# Patient Record
Sex: FEMALE | Race: WHITE | URBAN - METROPOLITAN AREA
[De-identification: names, ages, dates, MRNs, and addresses within clinical notes are randomized per-mention and may not be internally consistent; named-entity substitution may affect disease eponyms.]

---

## 2020-02-04 ENCOUNTER — OFFICE VISIT (OUTPATIENT)
Dept: FAMILY MEDICINE CLINIC | Facility: CLINIC | Age: 25
End: 2020-02-04
Payer: COMMERCIAL

## 2020-02-04 VITALS
HEART RATE: 96 BPM | BODY MASS INDEX: 20.31 KG/M2 | OXYGEN SATURATION: 98 % | HEIGHT: 63 IN | RESPIRATION RATE: 16 BRPM | SYSTOLIC BLOOD PRESSURE: 114 MMHG | TEMPERATURE: 98 F | DIASTOLIC BLOOD PRESSURE: 70 MMHG | WEIGHT: 114.63 LBS

## 2020-02-04 DIAGNOSIS — J00 ACUTE NASOPHARYNGITIS: Primary | ICD-10-CM

## 2020-02-04 DIAGNOSIS — J02.9 SORE THROAT: ICD-10-CM

## 2020-02-04 LAB
CONTROL LINE PRESENT WITH A CLEAR BACKGROUND (YES/NO): YES YES/NO
KIT LOT #: NORMAL NUMERIC
STREP GRP A CUL-SCR: NEGATIVE

## 2020-02-04 PROCEDURE — 87880 STREP A ASSAY W/OPTIC: CPT | Performed by: NURSE PRACTITIONER

## 2020-02-04 PROCEDURE — 87081 CULTURE SCREEN ONLY: CPT | Performed by: NURSE PRACTITIONER

## 2020-02-04 PROCEDURE — 99203 OFFICE O/P NEW LOW 30 MIN: CPT | Performed by: NURSE PRACTITIONER

## 2020-02-04 RX ORDER — DESOGESTREL AND ETHINYL ESTRADIOL 0.15-0.03
1 KIT ORAL DAILY
COMMUNITY
End: 2020-11-10 | Stop reason: ALTCHOICE

## 2020-02-04 NOTE — PROGRESS NOTES
CHIEF COMPLAINT:   Patient presents with:  Sore Throat  Nasal Congestion  Headache      HPI:   Ghulam Gonzalez is a 27year old female who presents to clinic today with complaints of feeling ill for the last 2 days   No cough-   Baby with RSV recently year old female who presents with ear problems symptoms are consistent with  ASSESSMENT:  Acute nasopharyngitis  (primary encounter diagnosis)  Sore throat    PLAN: Meds as listed below.   Comfort measures as described in Patient Instructions  Patient Instr

## 2020-02-04 NOTE — PATIENT INSTRUCTIONS
Rest, increase fluids, salt water gargles ,cameron pot (use distilled water) or saline nasal spray,  Advil cold and sinus (behind the counter), Alavert, halls Tylenol follow up if symptoms persist or increase.

## 2020-02-08 ENCOUNTER — TELEPHONE (OUTPATIENT)
Dept: FAMILY MEDICINE CLINIC | Facility: CLINIC | Age: 25
End: 2020-02-08

## 2020-02-08 RX ORDER — AMOXICILLIN 500 MG/1
500 CAPSULE ORAL 2 TIMES DAILY
Qty: 20 CAPSULE | Refills: 0 | Status: SHIPPED | OUTPATIENT
Start: 2020-02-08 | End: 2020-02-18

## 2020-02-08 NOTE — TELEPHONE ENCOUNTER
----- Message from DIXON Golden sent at 2/8/2020 11:20 AM CST -----  Results reviewed. Please inform patient that her strep culture came back positive for strep group A. I am sending her antibiotics to the [facility] she has on file.   She will begi

## 2020-02-11 ENCOUNTER — TELEPHONE (OUTPATIENT)
Dept: FAMILY MEDICINE CLINIC | Facility: CLINIC | Age: 25
End: 2020-02-11

## 2020-02-11 NOTE — TELEPHONE ENCOUNTER
----- Message from DIXON Aguilar sent at 2/8/2020 11:20 AM CST -----  Results reviewed. Please inform patient that her strep culture came back positive for strep group A. I am sending her antibiotics to the [facility] she has on file.   She will begi

## 2020-07-08 ENCOUNTER — APPOINTMENT (OUTPATIENT)
Dept: LAB | Age: 25
End: 2020-07-08
Attending: NURSE PRACTITIONER
Payer: COMMERCIAL

## 2020-07-08 ENCOUNTER — OFFICE VISIT (OUTPATIENT)
Dept: FAMILY MEDICINE CLINIC | Facility: CLINIC | Age: 25
End: 2020-07-08
Payer: COMMERCIAL

## 2020-07-08 VITALS
BODY MASS INDEX: 20.37 KG/M2 | WEIGHT: 115 LBS | RESPIRATION RATE: 18 BRPM | TEMPERATURE: 98 F | SYSTOLIC BLOOD PRESSURE: 90 MMHG | DIASTOLIC BLOOD PRESSURE: 54 MMHG | HEART RATE: 96 BPM | OXYGEN SATURATION: 99 % | HEIGHT: 63 IN

## 2020-07-08 DIAGNOSIS — R11.0 NAUSEA: Primary | ICD-10-CM

## 2020-07-08 DIAGNOSIS — R10.11 RIGHT UPPER QUADRANT ABDOMINAL PAIN: ICD-10-CM

## 2020-07-08 LAB
ALBUMIN SERPL-MCNC: 4 G/DL (ref 3.4–5)
ALBUMIN/GLOB SERPL: 1 {RATIO} (ref 1–2)
ALP LIVER SERPL-CCNC: 58 U/L (ref 52–144)
ALT SERPL-CCNC: 18 U/L (ref 13–56)
ANION GAP SERPL CALC-SCNC: 5 MMOL/L (ref 0–18)
APPEARANCE: CLEAR
AST SERPL-CCNC: 22 U/L (ref 15–37)
BASOPHILS # BLD AUTO: 0.05 X10(3) UL (ref 0–0.2)
BASOPHILS NFR BLD AUTO: 1.1 %
BILIRUB SERPL-MCNC: 0.3 MG/DL (ref 0.1–2)
BUN BLD-MCNC: 10 MG/DL (ref 7–18)
BUN/CREAT SERPL: 13.7 (ref 10–20)
CALCIUM BLD-MCNC: 9.7 MG/DL (ref 8.5–10.1)
CHLORIDE SERPL-SCNC: 109 MMOL/L (ref 98–112)
CO2 SERPL-SCNC: 24 MMOL/L (ref 21–32)
CREAT BLD-MCNC: 0.73 MG/DL (ref 0.55–1.02)
DEPRECATED HBV CORE AB SER IA-ACNC: 51.7 NG/ML (ref 12–114)
DEPRECATED RDW RBC AUTO: 38.4 FL (ref 35.1–46.3)
EOSINOPHIL # BLD AUTO: 0.12 X10(3) UL (ref 0–0.7)
EOSINOPHIL NFR BLD AUTO: 2.6 %
ERYTHROCYTE [DISTWIDTH] IN BLOOD BY AUTOMATED COUNT: 12.3 % (ref 11–15)
GLOBULIN PLAS-MCNC: 4 G/DL (ref 2.8–4.4)
GLUCOSE BLD-MCNC: 81 MG/DL (ref 70–99)
HCT VFR BLD AUTO: 39.1 % (ref 35–48)
HGB BLD-MCNC: 12.5 G/DL (ref 12–16)
IMM GRANULOCYTES # BLD AUTO: 0 X10(3) UL (ref 0–1)
IMM GRANULOCYTES NFR BLD: 0 %
IRON SATURATION: 19 % (ref 15–50)
IRON SERPL-MCNC: 90 UG/DL (ref 50–170)
LYMPHOCYTES # BLD AUTO: 1.71 X10(3) UL (ref 1–4)
LYMPHOCYTES NFR BLD AUTO: 36.5 %
M PROTEIN MFR SERPL ELPH: 8 G/DL (ref 6.4–8.2)
MCH RBC QN AUTO: 27.5 PG (ref 26–34)
MCHC RBC AUTO-ENTMCNC: 32 G/DL (ref 31–37)
MCV RBC AUTO: 85.9 FL (ref 80–100)
MONOCYTES # BLD AUTO: 0.36 X10(3) UL (ref 0.1–1)
MONOCYTES NFR BLD AUTO: 7.7 %
MULTISTIX LOT#: NORMAL NUMERIC
NEUTROPHILS # BLD AUTO: 2.45 X10 (3) UL (ref 1.5–7.7)
NEUTROPHILS # BLD AUTO: 2.45 X10(3) UL (ref 1.5–7.7)
NEUTROPHILS NFR BLD AUTO: 52.1 %
OSMOLALITY SERPL CALC.SUM OF ELEC: 284 MOSM/KG (ref 275–295)
PATIENT FASTING Y/N/NP: NO
PH, URINE: 7 (ref 4.5–8)
PLATELET # BLD AUTO: 245 10(3)UL (ref 150–450)
POTASSIUM SERPL-SCNC: 4.6 MMOL/L (ref 3.5–5.1)
RBC # BLD AUTO: 4.55 X10(6)UL (ref 3.8–5.3)
SODIUM SERPL-SCNC: 138 MMOL/L (ref 136–145)
SPECIFIC GRAVITY: 1.02 (ref 1–1.03)
T4 FREE SERPL-MCNC: 0.9 NG/DL (ref 0.8–1.7)
TOTAL IRON BINDING CAPACITY: 484 UG/DL (ref 240–450)
TRANSFERRIN SERPL-MCNC: 325 MG/DL (ref 200–360)
TSI SER-ACNC: 2.79 MIU/ML (ref 0.36–3.74)
URINE-COLOR: YELLOW
UROBILINOGEN,SEMI-QN: 0.2 MG/DL (ref 0–1.9)
VIT B12 SERPL-MCNC: 168 PG/ML (ref 193–986)
WBC # BLD AUTO: 4.7 X10(3) UL (ref 4–11)

## 2020-07-08 PROCEDURE — 84439 ASSAY OF FREE THYROXINE: CPT | Performed by: NURSE PRACTITIONER

## 2020-07-08 PROCEDURE — 82607 VITAMIN B-12: CPT | Performed by: NURSE PRACTITIONER

## 2020-07-08 PROCEDURE — 83540 ASSAY OF IRON: CPT | Performed by: NURSE PRACTITIONER

## 2020-07-08 PROCEDURE — 99214 OFFICE O/P EST MOD 30 MIN: CPT | Performed by: NURSE PRACTITIONER

## 2020-07-08 PROCEDURE — 83550 IRON BINDING TEST: CPT | Performed by: NURSE PRACTITIONER

## 2020-07-08 PROCEDURE — 82728 ASSAY OF FERRITIN: CPT | Performed by: NURSE PRACTITIONER

## 2020-07-08 PROCEDURE — 81003 URINALYSIS AUTO W/O SCOPE: CPT | Performed by: NURSE PRACTITIONER

## 2020-07-08 PROCEDURE — 36415 COLL VENOUS BLD VENIPUNCTURE: CPT | Performed by: NURSE PRACTITIONER

## 2020-07-08 PROCEDURE — 80050 GENERAL HEALTH PANEL: CPT | Performed by: NURSE PRACTITIONER

## 2020-07-08 NOTE — PROGRESS NOTES
Julia Zepeda is a 23year old female.   Patient presents with:  Abdominal Pain: x couple months, nausea everyday      HPI:   Complaints of nausea for a couple of months    Severe abdominal pain - lower abdomen- balled up on the couch- felt like contract adenopathy, normal thyroid, no nodules  LUNGS: normal rate without respiratory distress, lungs clear to auscultation  CARDIO: RRR without murmur, no edema  GI: Soft, slightly tender to right upper quadrant, no guarding, no rebound, no masses.   Earnest Province

## 2020-07-08 NOTE — PATIENT INSTRUCTIONS
Low fat/ fat free diet (< 2 G)      Follow up at [facility] for ultrasound-nothing to eat the morning of you may have small sips of water eat a low-fat diet or fat-free diet the night before very light. Call [phone] to schedule.     If ultrasound

## 2020-07-09 ENCOUNTER — TELEPHONE (OUTPATIENT)
Dept: FAMILY MEDICINE CLINIC | Facility: CLINIC | Age: 25
End: 2020-07-09

## 2020-07-09 DIAGNOSIS — E53.8 VITAMIN B 12 DEFICIENCY: Primary | ICD-10-CM

## 2020-07-09 NOTE — TELEPHONE ENCOUNTER
----- Message from DIXON Payne sent at 7/9/2020 11:09 AM CDT -----  Please notify patient that her hemoglobin is good at 12. 5–she is not anemic.   Her iron is also good, blood sugar, liver kidney, electrolytes, thyroid are all normal.  Her vitamin

## 2020-07-13 ENCOUNTER — TELEPHONE (OUTPATIENT)
Dept: FAMILY MEDICINE CLINIC | Facility: CLINIC | Age: 25
End: 2020-07-13

## 2020-07-13 DIAGNOSIS — R10.11 RIGHT UPPER QUADRANT ABDOMINAL PAIN: Primary | ICD-10-CM

## 2020-07-13 NOTE — TELEPHONE ENCOUNTER
Please notify patient that the ultrasound of her abdomen is essentially normal–there are no gallstones and no blockages.   I would recommend that patient schedule a HIDA scan with [facility]–please fax order to [facility] and give patient contact information to

## 2020-07-14 NOTE — TELEPHONE ENCOUNTER
Let pt know the following below. Pt verbalized her understanding and had no other questions at this time. Order faxed.

## 2020-07-29 ENCOUNTER — TELEPHONE (OUTPATIENT)
Dept: FAMILY MEDICINE CLINIC | Facility: CLINIC | Age: 25
End: 2020-07-29

## 2020-07-29 DIAGNOSIS — K81.1 CHOLECYSTITIS, CHRONIC: Primary | ICD-10-CM

## 2020-07-29 NOTE — TELEPHONE ENCOUNTER
Patient informed of the below results and recommendations. Patient in agreement to see Dr. Yin July. Contact information given to patient to schedule an appt.   Please advise referral.

## 2020-07-29 NOTE — TELEPHONE ENCOUNTER
Please notify patient that her HIDA scan shows that her gallbladder is not functioning well–her ejection fraction is 25%–this is low.   Would recommend that patient have a consultation with a surgeon–please asked patient if she has a preference otherwise wo

## 2020-11-10 ENCOUNTER — OFFICE VISIT (OUTPATIENT)
Dept: FAMILY MEDICINE CLINIC | Facility: CLINIC | Age: 25
End: 2020-11-10
Payer: COMMERCIAL

## 2020-11-10 VITALS
SYSTOLIC BLOOD PRESSURE: 102 MMHG | DIASTOLIC BLOOD PRESSURE: 60 MMHG | HEART RATE: 96 BPM | RESPIRATION RATE: 16 BRPM | BODY MASS INDEX: 19.49 KG/M2 | TEMPERATURE: 98 F | OXYGEN SATURATION: 98 % | WEIGHT: 110 LBS | HEIGHT: 63 IN

## 2020-11-10 DIAGNOSIS — M54.50 ACUTE BILATERAL LOW BACK PAIN WITHOUT SCIATICA: Primary | ICD-10-CM

## 2020-11-10 PROCEDURE — 3074F SYST BP LT 130 MM HG: CPT | Performed by: NURSE PRACTITIONER

## 2020-11-10 PROCEDURE — 3078F DIAST BP <80 MM HG: CPT | Performed by: NURSE PRACTITIONER

## 2020-11-10 PROCEDURE — 3008F BODY MASS INDEX DOCD: CPT | Performed by: NURSE PRACTITIONER

## 2020-11-10 PROCEDURE — 99072 ADDL SUPL MATRL&STAF TM PHE: CPT | Performed by: NURSE PRACTITIONER

## 2020-11-10 PROCEDURE — 99214 OFFICE O/P EST MOD 30 MIN: CPT | Performed by: NURSE PRACTITIONER

## 2020-11-10 RX ORDER — CYCLOBENZAPRINE HCL 5 MG
5 TABLET ORAL NIGHTLY PRN
Qty: 30 TABLET | Refills: 1 | Status: SHIPPED | OUTPATIENT
Start: 2020-11-10 | End: 2021-04-05

## 2020-11-10 RX ORDER — ACETAMINOPHEN AND CODEINE PHOSPHATE 120; 12 MG/5ML; MG/5ML
1 SOLUTION ORAL DAILY
COMMUNITY
Start: 2020-10-21

## 2020-11-10 NOTE — PATIENT INSTRUCTIONS
Take Ibuprofen 600mg three times a day with food, or aleve 1-2 pills twice a day with food. Muscle relaxant at bed time     Wear supportive shoes, heating pad alternate with ice,  Rest, avoid lifting.      Follow up if symptoms persist or increase

## 2020-11-10 NOTE — PROGRESS NOTES
Denise Vann is a 23year old female.   Patient presents with:  Back Pain: lower back pain x3 days      HPI:   Complaints of pain to lower back bilaterally - for a few days, no injury   Symptoms gradually  Hurting   Pain is constant- occasional sharper lesions  LUNGS: normal rate without respiratory distress, lungs clear to auscultation  CARDIO: RRR without murmur, no edema  MUSCULOSKELETAL: Back–no erythema, ecchymosis, edema–positive tight musculature to right Paraspinous muscles–full range of motion–s

## 2021-02-16 ENCOUNTER — OFFICE VISIT (OUTPATIENT)
Dept: FAMILY MEDICINE CLINIC | Facility: CLINIC | Age: 26
End: 2021-02-16
Payer: COMMERCIAL

## 2021-02-16 VITALS
WEIGHT: 112.38 LBS | DIASTOLIC BLOOD PRESSURE: 58 MMHG | SYSTOLIC BLOOD PRESSURE: 100 MMHG | RESPIRATION RATE: 16 BRPM | HEIGHT: 63 IN | HEART RATE: 100 BPM | BODY MASS INDEX: 19.91 KG/M2 | OXYGEN SATURATION: 99 % | TEMPERATURE: 99 F

## 2021-02-16 DIAGNOSIS — H61.23 BILATERAL IMPACTED CERUMEN: ICD-10-CM

## 2021-02-16 DIAGNOSIS — H65.03 NON-RECURRENT ACUTE SEROUS OTITIS MEDIA OF BOTH EARS: Primary | ICD-10-CM

## 2021-02-16 DIAGNOSIS — H93.11 TINNITUS OF RIGHT EAR: ICD-10-CM

## 2021-02-16 PROCEDURE — 3074F SYST BP LT 130 MM HG: CPT | Performed by: NURSE PRACTITIONER

## 2021-02-16 PROCEDURE — 3008F BODY MASS INDEX DOCD: CPT | Performed by: NURSE PRACTITIONER

## 2021-02-16 PROCEDURE — 3078F DIAST BP <80 MM HG: CPT | Performed by: NURSE PRACTITIONER

## 2021-02-16 PROCEDURE — 99214 OFFICE O/P EST MOD 30 MIN: CPT | Performed by: NURSE PRACTITIONER

## 2021-02-16 NOTE — PATIENT INSTRUCTIONS
To maintain wax removal recommend hydrogen peroxide or Debrox wax softening drops once a week–soak your ears for 15 minutes prior to shower and then rinse with warm water–rinse your ears with warm water every time you shower.     For the fluid behind your e

## 2021-02-16 NOTE — PROGRESS NOTES
CHIEF COMPLAINT:   Patient presents with:  Ear Pain: ringing in ears/pain      HPI:   Wong Andersen is a 23year old female who presents to clinic today with complaints of right ear is ringing for 4-5 days   Tried ear drops- dint help   No sinus conge injected. No exudates. NECK: supple, non-tender  THYROID: Normal size, no nodules  LUNGS: clear to auscultation bilaterally, no wheezes or rhonchi. Breathing is non labored.   CARDIO: RRR without murmur  SKIN: no rashes,no suspicious lesions  ASSESSMENT AN

## 2021-04-02 NOTE — PROGRESS NOTES
Deborah Fernandez is a 23year old female. Patient presents with:   Anxiety: anxious      HPI:   Patient presents in the office today with complaints of anxiety–states she has been seeing a counselor for a while–and the counselor feels that she may benefit vaginal delivery) 2014, 2019    x's 2       Social History:  Social History    Tobacco Use      Smoking status: Never Smoker      Smokeless tobacco: Never Used    Vaping Use      Vaping Use: Never used    Alcohol use: Never    Drug use: Never    No family avoid excessive caffeine, avoid alcohol, cigarettes, and street drugs. Write yourself a letter about everything that bothers you and how you are currently feeling be specific and give yourself details for the future.     Start Lexapro 1 by mouth daily

## 2021-04-05 ENCOUNTER — VIRTUAL PHONE E/M (OUTPATIENT)
Dept: FAMILY MEDICINE CLINIC | Facility: CLINIC | Age: 26
End: 2021-04-05
Payer: COMMERCIAL

## 2021-04-05 VITALS — WEIGHT: 108 LBS | BODY MASS INDEX: 19.14 KG/M2 | HEIGHT: 63 IN

## 2021-04-05 DIAGNOSIS — Z20.822 CLOSE EXPOSURE TO COVID-19 VIRUS: Primary | ICD-10-CM

## 2021-04-05 PROCEDURE — 99212 OFFICE O/P EST SF 10 MIN: CPT | Performed by: NURSE PRACTITIONER

## 2021-04-05 PROCEDURE — 3008F BODY MASS INDEX DOCD: CPT | Performed by: NURSE PRACTITIONER

## 2021-04-05 NOTE — PROGRESS NOTES
Sarah Dear  verbally consents to a Virtual/Telephone Check-In service on 4/5/2021. Patient understands and accepts financial responsibility for any deductible, co-insurance and/or co-pays associated with this service.     Duration of the service: 8 No N/V/C/D. NEURO: denies complaints    EXAM:   [facility] 06/24/2020 (Approximate)   GENERAL: Patient sounds to be in no apparent distress over the phone–physical exam done per patient–see HPI.     ASSESSMENT AND PLAN:   Jing Cook is a 23year old female

## 2021-04-09 ENCOUNTER — TELEPHONE (OUTPATIENT)
Dept: FAMILY MEDICINE CLINIC | Facility: CLINIC | Age: 26
End: 2021-04-09

## 2021-04-09 NOTE — TELEPHONE ENCOUNTER
Fax received from [facility]. Patient's Covid test done on 4/8/2021 came back Negative.

## 2021-04-09 NOTE — TELEPHONE ENCOUNTER
Patient's Covid test through [facility] came back as negative. Per visit indicated the patient was not symptomatic but had close Covid exposure.   Recommend continue with monitoring and isolation precautions, notify the office if she does develop symptom

## 2021-06-03 RX ORDER — ESCITALOPRAM OXALATE 10 MG/1
10 TABLET ORAL DAILY
Qty: 30 TABLET | Refills: 1 | OUTPATIENT
Start: 2021-06-03

## 2021-06-03 NOTE — TELEPHONE ENCOUNTER
Future Appointments   Date Time Provider Gary Herzog   6/3/2021  4:30 PM DIXON Barahona [facility]

## 2021-06-03 NOTE — PROGRESS NOTES
Denise Vann is a 27year old female. No chief complaint on file.       HPI:   Patient presents the office today via video visit for follow-up of anxiety -  Taking lexapro -states she is feeling better feels like medication is working–states that she d exertion, no cough  CARDIOVASCULAR: denies complaints   GI: denies abdominal pain, nausea, vomiting, diarrhea   MUSCULOSKELETAL:  No arthralgias or myalgias  NEURO: denies headaches, denies dizziness  PSYCH: See HPI    EXAM:   [facility] 06/24/2020 (Approximate)

## 2021-06-03 NOTE — TELEPHONE ENCOUNTER
Future appt:    Last Appointment with provider:   4/2/2021  Last appointment at [facility]:  4/2/2021  No results found for: CHOLEST, HDL, LDL, TRIGLY, TRIG  No results found for: EAG, A1C  Lab Results   Component Value Date    T4F 0.9 07/08/2020    TSH

## 2021-10-18 ENCOUNTER — TELEPHONE (OUTPATIENT)
Dept: FAMILY MEDICINE CLINIC | Facility: CLINIC | Age: 26
End: 2021-10-18

## 2021-10-18 NOTE — TELEPHONE ENCOUNTER
Pt calling and state she is having some light headed and Dizziness for 2 weeks. She is concerned about driving and feels this has to do with her eating/stomache issues. Call sent to nurse to triage.

## 2021-10-18 NOTE — TELEPHONE ENCOUNTER
Pt called back and states that she was wondering if she should wait to go to ER or what she should do    I recommended that she goes now since this has gotten progressively worse over the past two weeks.     She states that sounds okay    Will call back if

## 2021-10-18 NOTE — TELEPHONE ENCOUNTER
Please advise-    Pt is calling today because since her gallbladder was removed (June 2021) she has had issues eating. States that just the thought of eating will make her feel sick, she tries to eat sometimes and it immediately makes her nauseas.      Pt s

## 2021-10-18 NOTE — TELEPHONE ENCOUNTER
Called and left a message-  [email or web address] message also sent. Pt was aware I would send a [email or web address] message if she didn't answer.

## 2021-10-18 NOTE — TELEPHONE ENCOUNTER
If symptoms are worsening would recommend emergency room she may need IV antibiotics would recommend trying some Gatorade and –over-the-counter antinausea medications

## 2022-06-30 ENCOUNTER — OFFICE VISIT (OUTPATIENT)
Dept: FAMILY MEDICINE CLINIC | Facility: CLINIC | Age: 27
End: 2022-06-30
Payer: COMMERCIAL

## 2022-06-30 VITALS
OXYGEN SATURATION: 99 % | WEIGHT: 108 LBS | RESPIRATION RATE: 18 BRPM | SYSTOLIC BLOOD PRESSURE: 106 MMHG | BODY MASS INDEX: 19.14 KG/M2 | HEIGHT: 63 IN | TEMPERATURE: 98 F | DIASTOLIC BLOOD PRESSURE: 60 MMHG | HEART RATE: 88 BPM

## 2022-06-30 DIAGNOSIS — Z00.00 ENCOUNTER FOR HEALTH MAINTENANCE EXAMINATION IN ADULT: Primary | ICD-10-CM

## 2022-06-30 DIAGNOSIS — Z01.419 ENCOUNTER FOR GYNECOLOGICAL EXAMINATION: ICD-10-CM

## 2022-06-30 PROBLEM — O47.9 IRREGULAR CONTRACTIONS: Status: ACTIVE | Noted: 2019-09-19

## 2022-06-30 PROBLEM — K59.00 CONSTIPATION: Status: ACTIVE | Noted: 2022-01-06

## 2022-06-30 PROBLEM — O21.9 NAUSEA AND VOMITING OF PREGNANCY, ANTEPARTUM: Status: ACTIVE | Noted: 2019-08-28

## 2022-06-30 PROBLEM — R10.13 EPIGASTRIC PAIN: Status: ACTIVE | Noted: 2022-01-06

## 2022-06-30 PROBLEM — O47.9 UTERINE CONTRACTIONS DURING PREGNANCY: Status: ACTIVE | Noted: 2019-09-20

## 2022-06-30 PROBLEM — R10.84 GENERALIZED ABDOMINAL PAIN: Status: ACTIVE | Noted: 2022-01-06

## 2022-06-30 PROCEDURE — 3078F DIAST BP <80 MM HG: CPT | Performed by: NURSE PRACTITIONER

## 2022-06-30 PROCEDURE — 99395 PREV VISIT EST AGE 18-39: CPT | Performed by: NURSE PRACTITIONER

## 2022-06-30 PROCEDURE — 3008F BODY MASS INDEX DOCD: CPT | Performed by: NURSE PRACTITIONER

## 2022-06-30 PROCEDURE — 3074F SYST BP LT 130 MM HG: CPT | Performed by: NURSE PRACTITIONER

## 2022-06-30 NOTE — PATIENT INSTRUCTIONS
Check to see if a Quantiferon Gold TB blood test is acceptable - otherwise you can do your TB test here or [facility]     Pap smear today - results will be back within 1-2 weeks and we will notify you     Recommend fasting blood work next year     Form filled out for [employer]

## 2022-07-11 ENCOUNTER — TELEPHONE (OUTPATIENT)
Dept: FAMILY MEDICINE CLINIC | Facility: CLINIC | Age: 27
End: 2022-07-11

## 2022-07-11 NOTE — TELEPHONE ENCOUNTER
----- Message from DIXON Figueroa sent at 7/11/2022 11:50 AM CDT -----  Please notify patient that her Pap smear is within normal limits. Recommend annual physical, will discuss need for Pap at physical next year. Thank you.
Provider message viewed by patient in [employer].
done
done

## 2022-09-21 ENCOUNTER — TELEPHONE (OUTPATIENT)
Dept: FAMILY MEDICINE CLINIC | Facility: CLINIC | Age: 27
End: 2022-09-21

## 2022-09-21 NOTE — TELEPHONE ENCOUNTER
Spoke with patient. States she has been having more frequent panic attacks the last couple weeks. States her heart races and her chest feels tight during these episodes. Patient states it has been a really long time since this has happened. Patient c/o frequent headaches and trouble sleeping. Patient also c/o a couple dizzy spells recently. States when she gets dizzy, she can feel her knees buckle and she needs to sit down. Patient states one of the times she kind of fell into her  and he had to catch her. States yesterday this happened in the shower. States she became dizzy and when she went to sit down on the seat in the shower, her foot slipped, and she hit her head on the rail. Patient states she did sit on the seat; she denies falling. Patient denies passing out any of the times she has felt dizzy. Patient states the panic attacks and dizziness occur randomly and at separate times. Patient cannot think of anything specific that bring either episodes on. Appt r/s for a sooner time. Please advise.      Future Appointments   Date Time Provider Gary Herzog   9/22/2022  1:00 PM DIXON Spencer [facility]

## 2022-09-21 NOTE — TELEPHONE ENCOUNTER
Future Appointments   Date Time Provider Gary Herzog   9/29/2022  3:30 PM DIXON Singh [facility] [place]     made an appt via [employer] for \"Anxiety resurfacing with problems sleeping, panic attacks and dizziness occurring frequently\"

## 2022-09-22 ENCOUNTER — OFFICE VISIT (OUTPATIENT)
Dept: FAMILY MEDICINE CLINIC | Facility: CLINIC | Age: 27
End: 2022-09-22

## 2022-09-22 ENCOUNTER — LABORATORY ENCOUNTER (OUTPATIENT)
Dept: LAB | Age: 27
End: 2022-09-22
Attending: NURSE PRACTITIONER

## 2022-09-22 VITALS
TEMPERATURE: 98 F | OXYGEN SATURATION: 100 % | WEIGHT: 109.63 LBS | HEART RATE: 76 BPM | SYSTOLIC BLOOD PRESSURE: 106 MMHG | RESPIRATION RATE: 16 BRPM | HEIGHT: 62.5 IN | DIASTOLIC BLOOD PRESSURE: 60 MMHG | BODY MASS INDEX: 19.67 KG/M2

## 2022-09-22 DIAGNOSIS — R42 LIGHT HEADED: ICD-10-CM

## 2022-09-22 DIAGNOSIS — F41.9 ANXIETY: ICD-10-CM

## 2022-09-22 DIAGNOSIS — Z86.2 HISTORY OF IRON DEFICIENCY ANEMIA: ICD-10-CM

## 2022-09-22 DIAGNOSIS — R53.83 FATIGUE, UNSPECIFIED TYPE: ICD-10-CM

## 2022-09-22 DIAGNOSIS — R53.83 FATIGUE, UNSPECIFIED TYPE: Primary | ICD-10-CM

## 2022-09-22 LAB
ALBUMIN SERPL-MCNC: 4.1 G/DL (ref 3.4–5)
ALBUMIN/GLOB SERPL: 1.2 {RATIO} (ref 1–2)
ALP LIVER SERPL-CCNC: 65 U/L
ALT SERPL-CCNC: 13 U/L
ANION GAP SERPL CALC-SCNC: 4 MMOL/L (ref 0–18)
AST SERPL-CCNC: 12 U/L (ref 15–37)
B-HCG SERPL-ACNC: <1 MIU/ML
BASOPHILS # BLD AUTO: 0.09 X10(3) UL (ref 0–0.2)
BASOPHILS NFR BLD AUTO: 1.6 %
BILIRUB SERPL-MCNC: 0.3 MG/DL (ref 0.1–2)
BUN BLD-MCNC: 10 MG/DL (ref 7–18)
CALCIUM BLD-MCNC: 9.3 MG/DL (ref 8.5–10.1)
CHLORIDE SERPL-SCNC: 108 MMOL/L (ref 98–112)
CO2 SERPL-SCNC: 27 MMOL/L (ref 21–32)
CREAT BLD-MCNC: 0.74 MG/DL
DEPRECATED HBV CORE AB SER IA-ACNC: 15.1 NG/ML
EOSINOPHIL # BLD AUTO: 0.11 X10(3) UL (ref 0–0.7)
EOSINOPHIL NFR BLD AUTO: 2 %
ERYTHROCYTE [DISTWIDTH] IN BLOOD BY AUTOMATED COUNT: 13.2 %
FASTING STATUS PATIENT QL REPORTED: NO
GFR SERPLBLD BASED ON 1.73 SQ M-ARVRAT: 115 ML/MIN/1.73M2 (ref 60–?)
GLOBULIN PLAS-MCNC: 3.5 G/DL (ref 2.8–4.4)
GLUCOSE BLD-MCNC: 83 MG/DL (ref 70–99)
HCT VFR BLD AUTO: 40.1 %
HGB BLD-MCNC: 12.9 G/DL
IMM GRANULOCYTES # BLD AUTO: 0.01 X10(3) UL (ref 0–1)
IMM GRANULOCYTES NFR BLD: 0.2 %
IRON SATN MFR SERPL: 21 %
IRON SERPL-MCNC: 89 UG/DL
LYMPHOCYTES # BLD AUTO: 1.89 X10(3) UL (ref 1–4)
LYMPHOCYTES NFR BLD AUTO: 34.2 %
MAGNESIUM SERPL-MCNC: 2.2 MG/DL (ref 1.6–2.6)
MCH RBC QN AUTO: 27.9 PG (ref 26–34)
MCHC RBC AUTO-ENTMCNC: 32.2 G/DL (ref 31–37)
MCV RBC AUTO: 86.6 FL
MONOCYTES # BLD AUTO: 0.43 X10(3) UL (ref 0.1–1)
MONOCYTES NFR BLD AUTO: 7.8 %
NEUTROPHILS # BLD AUTO: 3 X10 (3) UL (ref 1.5–7.7)
NEUTROPHILS # BLD AUTO: 3 X10(3) UL (ref 1.5–7.7)
NEUTROPHILS NFR BLD AUTO: 54.2 %
OSMOLALITY SERPL CALC.SUM OF ELEC: 286 MOSM/KG (ref 275–295)
PLATELET # BLD AUTO: 248 10(3)UL (ref 150–450)
POTASSIUM SERPL-SCNC: 4.1 MMOL/L (ref 3.5–5.1)
PROT SERPL-MCNC: 7.6 G/DL (ref 6.4–8.2)
RBC # BLD AUTO: 4.63 X10(6)UL
SODIUM SERPL-SCNC: 139 MMOL/L (ref 136–145)
T4 FREE SERPL-MCNC: 0.8 NG/DL (ref 0.8–1.7)
TIBC SERPL-MCNC: 423 UG/DL (ref 240–450)
TRANSFERRIN SERPL-MCNC: 284 MG/DL (ref 200–360)
TSI SER-ACNC: 2.26 MIU/ML (ref 0.36–3.74)
VIT B12 SERPL-MCNC: 275 PG/ML (ref 193–986)
WBC # BLD AUTO: 5.5 X10(3) UL (ref 4–11)

## 2022-09-22 PROCEDURE — 3008F BODY MASS INDEX DOCD: CPT | Performed by: NURSE PRACTITIONER

## 2022-09-22 PROCEDURE — 80050 GENERAL HEALTH PANEL: CPT | Performed by: NURSE PRACTITIONER

## 2022-09-22 PROCEDURE — 83540 ASSAY OF IRON: CPT | Performed by: NURSE PRACTITIONER

## 2022-09-22 PROCEDURE — 3074F SYST BP LT 130 MM HG: CPT | Performed by: NURSE PRACTITIONER

## 2022-09-22 PROCEDURE — 82728 ASSAY OF FERRITIN: CPT | Performed by: NURSE PRACTITIONER

## 2022-09-22 PROCEDURE — 84439 ASSAY OF FREE THYROXINE: CPT | Performed by: NURSE PRACTITIONER

## 2022-09-22 PROCEDURE — 84702 CHORIONIC GONADOTROPIN TEST: CPT | Performed by: NURSE PRACTITIONER

## 2022-09-22 PROCEDURE — 83735 ASSAY OF MAGNESIUM: CPT | Performed by: NURSE PRACTITIONER

## 2022-09-22 PROCEDURE — 82607 VITAMIN B-12: CPT | Performed by: NURSE PRACTITIONER

## 2022-09-22 PROCEDURE — 83550 IRON BINDING TEST: CPT | Performed by: NURSE PRACTITIONER

## 2022-09-22 PROCEDURE — 3078F DIAST BP <80 MM HG: CPT | Performed by: NURSE PRACTITIONER

## 2022-09-22 PROCEDURE — 99214 OFFICE O/P EST MOD 30 MIN: CPT | Performed by: NURSE PRACTITIONER

## 2022-09-22 NOTE — PATIENT INSTRUCTIONS
Non fasting blood work today     Use Apple watch to monitor your sleep -  If abnormal sleep -then recommend appointment with [facility] or DR. Mckeon for sleep evaluation     It is important to get enough sleep (at least 7 hrs a night). Increase EXERCISE, eat a healthy diet (5 fruits and/or vegetables a day), stay hydrated,  take a multivitamin, take fish/krill oil capsules, learn something new, avoid excessive caffeine, avoid alcohol, cigarettes, and street drugs.      Follow up if symptoms persist or increase

## 2022-09-23 ENCOUNTER — TELEPHONE (OUTPATIENT)
Dept: FAMILY MEDICINE CLINIC | Facility: CLINIC | Age: 27
End: 2022-09-23

## 2022-09-23 NOTE — TELEPHONE ENCOUNTER
----- Message from DIXON Figueroa sent at 9/23/2022  4:39 PM CDT -----  Please make sure patient receives [employer] messages below-Your pregnancy test came back negative as expected. Your blood sugar is normal, liver and kidney function are normal, thyroid is normal, vitamin B12 is on the lower end of normal although improved from previous-already taking vitamin B12 supplements? We could consider B12 injections-this may help your energy as well-please let me know if you are taking vitamin B12 and how much  Magnesium is normal, iron is normal, blood count is normal-no anemia.   Thank DIXON Muñiz, FNP-BC

## 2022-11-29 ENCOUNTER — TELEPHONE (OUTPATIENT)
Dept: FAMILY MEDICINE CLINIC | Facility: CLINIC | Age: 27
End: 2022-11-29

## 2022-11-29 NOTE — TELEPHONE ENCOUNTER
Called pt, confirmed she does need to be seen for work px needs to be within 90 days    Future Appointments   Date Time Provider Gary Herzog   12/16/2022  8:30 AM DIXON Cisse [facility]

## 2022-11-29 NOTE — TELEPHONE ENCOUNTER
Starting a new job and needs a work px. Just had her annual px in August, can it be used for the work px. Has a form to fill out.

## 2022-12-16 ENCOUNTER — OFFICE VISIT (OUTPATIENT)
Dept: FAMILY MEDICINE CLINIC | Facility: CLINIC | Age: 27
End: 2022-12-16
Payer: COMMERCIAL

## 2022-12-16 VITALS
BODY MASS INDEX: 20.16 KG/M2 | TEMPERATURE: 99 F | DIASTOLIC BLOOD PRESSURE: 62 MMHG | SYSTOLIC BLOOD PRESSURE: 102 MMHG | WEIGHT: 112.38 LBS | HEART RATE: 83 BPM | RESPIRATION RATE: 16 BRPM | OXYGEN SATURATION: 98 % | HEIGHT: 62.5 IN

## 2022-12-16 DIAGNOSIS — Z02.1 PRE-EMPLOYMENT EXAMINATION: Primary | ICD-10-CM

## 2023-05-16 ENCOUNTER — OFFICE VISIT (OUTPATIENT)
Dept: FAMILY MEDICINE CLINIC | Facility: CLINIC | Age: 28
End: 2023-05-16
Payer: COMMERCIAL

## 2023-05-16 VITALS
HEART RATE: 100 BPM | RESPIRATION RATE: 18 BRPM | DIASTOLIC BLOOD PRESSURE: 68 MMHG | SYSTOLIC BLOOD PRESSURE: 116 MMHG | HEIGHT: 62.5 IN | TEMPERATURE: 99 F | OXYGEN SATURATION: 98 % | WEIGHT: 110.63 LBS | BODY MASS INDEX: 19.85 KG/M2

## 2023-05-16 DIAGNOSIS — J02.9 SORE THROAT: ICD-10-CM

## 2023-05-16 DIAGNOSIS — J06.9 VIRAL UPPER RESPIRATORY TRACT INFECTION: Primary | ICD-10-CM

## 2023-05-16 PROCEDURE — 87880 STREP A ASSAY W/OPTIC: CPT | Performed by: NURSE PRACTITIONER

## 2023-05-16 PROCEDURE — 3008F BODY MASS INDEX DOCD: CPT | Performed by: NURSE PRACTITIONER

## 2023-05-16 PROCEDURE — 3078F DIAST BP <80 MM HG: CPT | Performed by: NURSE PRACTITIONER

## 2023-05-16 PROCEDURE — 99213 OFFICE O/P EST LOW 20 MIN: CPT | Performed by: NURSE PRACTITIONER

## 2023-05-16 PROCEDURE — 3074F SYST BP LT 130 MM HG: CPT | Performed by: NURSE PRACTITIONER

## 2023-05-16 PROCEDURE — 87637 SARSCOV2&INF A&B&RSV AMP PRB: CPT | Performed by: NURSE PRACTITIONER

## 2023-05-16 NOTE — PATIENT INSTRUCTIONS
Rest, increase fluids, salt water gargles ,neti pot (use distilled water) or saline nasal spray, Advil cold and sinus (behind the counter), Alavert,  Vicks vapo rub, halls, Tylenol/Ibuprofen, follow up if symptoms persist or increase.

## 2023-05-17 LAB
FLUAV + FLUBV RNA SPEC NAA+PROBE: NEGATIVE
FLUAV + FLUBV RNA SPEC NAA+PROBE: NEGATIVE
RSV RNA SPEC NAA+PROBE: NEGATIVE
SARS-COV-2 RNA RESP QL NAA+PROBE: NOT DETECTED

## 2023-06-13 ENCOUNTER — OFFICE VISIT (OUTPATIENT)
Dept: FAMILY MEDICINE CLINIC | Facility: CLINIC | Age: 28
End: 2023-06-13
Payer: COMMERCIAL

## 2023-06-13 VITALS
RESPIRATION RATE: 14 BRPM | OXYGEN SATURATION: 99 % | BODY MASS INDEX: 20.39 KG/M2 | TEMPERATURE: 98 F | HEART RATE: 79 BPM | WEIGHT: 113.63 LBS | HEIGHT: 62.5 IN | SYSTOLIC BLOOD PRESSURE: 113 MMHG | DIASTOLIC BLOOD PRESSURE: 72 MMHG

## 2023-06-13 DIAGNOSIS — F41.9 ANXIETY: ICD-10-CM

## 2023-06-13 DIAGNOSIS — Z00.00 ENCOUNTER FOR HEALTH MAINTENANCE EXAMINATION IN ADULT: Primary | ICD-10-CM

## 2023-06-13 PROCEDURE — 3078F DIAST BP <80 MM HG: CPT | Performed by: NURSE PRACTITIONER

## 2023-06-13 PROCEDURE — 99395 PREV VISIT EST AGE 18-39: CPT | Performed by: NURSE PRACTITIONER

## 2023-06-13 PROCEDURE — 3008F BODY MASS INDEX DOCD: CPT | Performed by: NURSE PRACTITIONER

## 2023-06-13 PROCEDURE — 3074F SYST BP LT 130 MM HG: CPT | Performed by: NURSE PRACTITIONER

## 2023-06-13 NOTE — PATIENT INSTRUCTIONS
Recommend vitamin B 12 supplement      It is important to get enough sleep (at least 7 hrs a night). Increase EXERCISE, eat a healthy diet (5 fruits and/or vegetables a day), stay hydrated,  take a multivitamin, take fish/krill oil capsules, learn something new, avoid excessive caffeine, avoid alcohol, cigarettes, and street drugs.       Fasting blood work next year     No pap this year - plan for pan in the next 1-2 years     Work form completed

## (undated) NOTE — LETTER
Date: 9/22/2022    Patient Name: Dionicio Oseguera          To Whom it may concern: This letter has been written at the patient's request. The above patient was seen at the [facility] for treatment of a medical condition. This patient should be excused from attending school this afternoon due to a medical appointment. She may return tomorrow without restrictions.        Sincerely,    DIXON Lee

## (undated) NOTE — LETTER
04/05/21    To whom it may concern,    John Bennett was seen in our office today via virtual visit with exposure to COVID-19. Her last day of known exposure was April 4, 2021–she should remain isolated for 14 days.   She may return to work on April 19, 2021 if s